# Patient Record
Sex: MALE | URBAN - METROPOLITAN AREA
[De-identification: names, ages, dates, MRNs, and addresses within clinical notes are randomized per-mention and may not be internally consistent; named-entity substitution may affect disease eponyms.]

---

## 2019-05-17 ENCOUNTER — OFFICE VISIT (OUTPATIENT)
Dept: URBAN - METROPOLITAN AREA CLINIC 48 | Facility: CLINIC | Age: 84
End: 2019-05-17
Payer: MEDICARE

## 2019-05-17 DIAGNOSIS — H40.013 GLAUCOMA SUSPECT - LOW RISK BILATERAL: ICD-10-CM

## 2019-05-17 DIAGNOSIS — H27.131 DISLOCATED POSTERIOR LENS OF RIGHT EYE: Primary | ICD-10-CM

## 2019-05-17 DIAGNOSIS — H25.11 AGE-RELATED NUCLEAR CATARACT, RIGHT EYE: ICD-10-CM

## 2019-05-17 PROCEDURE — 99204 OFFICE O/P NEW MOD 45 MIN: CPT | Performed by: OPHTHALMOLOGY

## 2019-05-17 PROCEDURE — 92134 CPTRZ OPH DX IMG PST SGM RTA: CPT | Performed by: OPHTHALMOLOGY

## 2019-05-17 RX ORDER — BRINZOLAMIDE 10 MG/ML
1 % SUSPENSION/ DROPS OPHTHALMIC
Qty: 1 | Refills: 5 | Status: INACTIVE
Start: 2019-05-17 | End: 2019-06-10

## 2019-05-17 RX ORDER — BRIMONIDINE TARTRATE, TIMOLOL MALEATE 2; 5 MG/ML; MG/ML
SOLUTION/ DROPS OPHTHALMIC
Qty: 1 | Refills: 5 | Status: INACTIVE
Start: 2019-05-17 | End: 2019-06-10

## 2019-05-17 ASSESSMENT — INTRAOCULAR PRESSURE
OD: 60
OS: 16

## 2019-05-17 NOTE — IMPRESSION/PLAN
Impression: Glaucoma Suspect - Bilateral: H40.013. OU. Plan: Discussed with patient the compliance of instilling gtts. Patient given a new Rx today for Combigan BID OU and Azopt TID OU. Patient was advised to start the gtts right away to help prevent further elevation of IOP. Patient understood and agrees with plan.

## 2019-05-17 NOTE — IMPRESSION/PLAN
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 OD. Plan: Discussed with patient I recommend surgery to remove the subluxed lens, however discussed with patient it's unknown if a new IOL will be placed in the day of surgery or at a later time. Patient was advised of all the R/b/a's with patient and he elects to proceed with surgery.  RL-2

## 2019-05-22 ENCOUNTER — SURGERY (OUTPATIENT)
Dept: URBAN - METROPOLITAN AREA SURGERY 26 | Facility: SURGERY | Age: 84
End: 2019-05-22
Payer: MEDICARE

## 2019-05-22 PROCEDURE — 66852 REMOVAL OF LENS MATERIAL: CPT | Performed by: OPHTHALMOLOGY

## 2019-05-23 ENCOUNTER — POST-OPERATIVE VISIT (OUTPATIENT)
Dept: URBAN - METROPOLITAN AREA CLINIC 48 | Facility: CLINIC | Age: 84
End: 2019-05-23
Payer: MEDICARE

## 2019-05-23 PROCEDURE — 99024 POSTOP FOLLOW-UP VISIT: CPT | Performed by: OPHTHALMOLOGY

## 2019-05-23 RX ORDER — OFLOXACIN 3 MG/ML
0.3 % SOLUTION/ DROPS OPHTHALMIC
Qty: 1 | Refills: 1 | Status: INACTIVE
Start: 2019-05-23 | End: 2019-06-10

## 2019-05-23 RX ORDER — PREDNISOLONE ACETATE 10 MG/ML
1 % SUSPENSION/ DROPS OPHTHALMIC
Qty: 1 | Refills: 2 | Status: INACTIVE
Start: 2019-05-23 | End: 2019-11-13

## 2019-05-23 ASSESSMENT — INTRAOCULAR PRESSURE: OD: 17

## 2019-05-29 ENCOUNTER — POST-OPERATIVE VISIT (OUTPATIENT)
Dept: URBAN - METROPOLITAN AREA CLINIC 48 | Facility: CLINIC | Age: 84
End: 2019-05-29

## 2019-05-29 DIAGNOSIS — Z09 ENCNTR FOR F/U EXAM AFT TRTMT FOR COND OTH THAN MALIG NEOPLM: Primary | ICD-10-CM

## 2019-05-29 PROCEDURE — 99024 POSTOP FOLLOW-UP VISIT: CPT | Performed by: OPHTHALMOLOGY

## 2019-05-29 ASSESSMENT — INTRAOCULAR PRESSURE
OD: 31
OS: 29

## 2019-06-05 ENCOUNTER — POST-OPERATIVE VISIT (OUTPATIENT)
Dept: URBAN - METROPOLITAN AREA CLINIC 48 | Facility: CLINIC | Age: 84
End: 2019-06-05

## 2019-06-05 PROCEDURE — 99024 POSTOP FOLLOW-UP VISIT: CPT | Performed by: OPHTHALMOLOGY

## 2019-06-05 ASSESSMENT — INTRAOCULAR PRESSURE
OS: 14
OD: 33

## 2019-06-10 ENCOUNTER — OFFICE VISIT (OUTPATIENT)
Dept: URBAN - METROPOLITAN AREA CLINIC 48 | Facility: CLINIC | Age: 84
End: 2019-06-10
Payer: MEDICARE

## 2019-06-10 PROCEDURE — 92133 CPTRZD OPH DX IMG PST SGM ON: CPT | Performed by: OPHTHALMOLOGY

## 2019-06-10 PROCEDURE — 92020 GONIOSCOPY: CPT | Performed by: OPHTHALMOLOGY

## 2019-06-10 PROCEDURE — 92014 COMPRE OPH EXAM EST PT 1/>: CPT | Performed by: OPHTHALMOLOGY

## 2019-06-10 PROCEDURE — 92083 EXTENDED VISUAL FIELD XM: CPT | Performed by: OPHTHALMOLOGY

## 2019-06-10 RX ORDER — DORZOLAMIDE HCL 20 MG/ML
2 % SOLUTION/ DROPS OPHTHALMIC
Qty: 1 | Refills: 4 | Status: INACTIVE
Start: 2019-06-10 | End: 2019-11-05

## 2019-06-10 RX ORDER — BRIMONIDINE TARTRATE, TIMOLOL MALEATE 2; 5 MG/ML; MG/ML
SOLUTION/ DROPS OPHTHALMIC
Qty: 1 | Refills: 5 | Status: INACTIVE
Start: 2019-06-10 | End: 2019-11-05

## 2019-06-10 ASSESSMENT — INTRAOCULAR PRESSURE
OD: 37
OS: 10

## 2019-06-10 NOTE — IMPRESSION/PLAN
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 Plan: End stage optic nerve damage, uncontrolled IOP. Option: 
1) Observation with steroid taper and continued combigan bid OD, add dorzolamide bid OD. 2)diode cyclophotocoagulation OD. Most likely will need eye drops for IOP lowering post laser and may lose vision totally. Patient prefers option 1. IOP check in 2 weeks.

## 2019-11-05 ENCOUNTER — OFFICE VISIT (OUTPATIENT)
Dept: URBAN - METROPOLITAN AREA CLINIC 48 | Facility: CLINIC | Age: 84
End: 2019-11-05
Payer: MEDICARE

## 2019-11-05 DIAGNOSIS — H40.1113 PRIMARY OPEN-ANGLE GLAUCOMA, RIGHT EYE, SEVERE STAGE: Primary | ICD-10-CM

## 2019-11-05 DIAGNOSIS — H27.01 APHAKIA, RIGHT EYE: ICD-10-CM

## 2019-11-05 PROCEDURE — 92002 INTRM OPH EXAM NEW PATIENT: CPT | Performed by: OPTOMETRIST

## 2019-11-05 RX ORDER — BRIMONIDINE TARTRATE, TIMOLOL MALEATE 2; 5 MG/ML; MG/ML
SOLUTION/ DROPS OPHTHALMIC
Qty: 1 | Refills: 5 | Status: ACTIVE
Start: 2019-11-05

## 2019-11-05 ASSESSMENT — INTRAOCULAR PRESSURE
OD: 42
OS: 14

## 2019-11-05 NOTE — IMPRESSION/PLAN
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 Plan: End stage optic nerve damage, uncontrolled IOP. Rx Combigan OD BID, Dorzolamide OD BID

RTO next available with Nirmala Perkins

## 2019-11-13 ENCOUNTER — OFFICE VISIT (OUTPATIENT)
Dept: URBAN - METROPOLITAN AREA CLINIC 48 | Facility: CLINIC | Age: 84
End: 2019-11-13
Payer: MEDICARE

## 2019-11-13 PROCEDURE — 92012 INTRM OPH EXAM EST PATIENT: CPT | Performed by: OPHTHALMOLOGY

## 2019-11-13 ASSESSMENT — INTRAOCULAR PRESSURE
OD: 23
OS: 14

## 2019-11-13 NOTE — IMPRESSION/PLAN
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 Plan: Discussed and reviewed diagnosis with patient today, understood by patient, intraocular pressure stable with medication. Continue medications and observe. Importance of compliance with medications and regular follow-up reiterated, will continue to monitor. Patient to continue with Combigan BID OD AND at as needed.  Patient to discontinue Dorzolamide BID OD